# Patient Record
Sex: MALE | Race: WHITE | NOT HISPANIC OR LATINO | URBAN - METROPOLITAN AREA
[De-identification: names, ages, dates, MRNs, and addresses within clinical notes are randomized per-mention and may not be internally consistent; named-entity substitution may affect disease eponyms.]

---

## 2017-09-04 ENCOUNTER — EMERGENCY (EMERGENCY)
Facility: HOSPITAL | Age: 32
LOS: 1 days | Discharge: HOME | End: 2017-09-04
Attending: EMERGENCY MEDICINE | Admitting: EMERGENCY MEDICINE
Payer: MEDICAID

## 2017-09-04 VITALS
HEART RATE: 65 BPM | WEIGHT: 242.07 LBS | OXYGEN SATURATION: 100 % | RESPIRATION RATE: 16 BRPM | DIASTOLIC BLOOD PRESSURE: 79 MMHG | SYSTOLIC BLOOD PRESSURE: 128 MMHG | TEMPERATURE: 98 F | HEIGHT: 71 IN

## 2017-09-04 LAB — LITHIUM SERPL-MCNC: 0.9 MMOL/L — SIGNIFICANT CHANGE UP (ref 0.6–1.2)

## 2017-09-04 PROCEDURE — 99283 EMERGENCY DEPT VISIT LOW MDM: CPT

## 2017-09-04 PROCEDURE — 80178 ASSAY OF LITHIUM: CPT

## 2017-09-04 PROCEDURE — 36415 COLL VENOUS BLD VENIPUNCTURE: CPT

## 2017-09-04 NOTE — ED PROVIDER NOTE - OBJECTIVE STATEMENT
Pt  is a 30 yo male hx of bipolar, mrcp. pt lives at group home. pt has recently had lithium level changed from 2x daily to 3 times daily. pt had dose then adjusted again. pt went saturday home to family from group home and had 16 pills. pt today came back with only 6 pills and had already taken am dose on saturday. pt with slight agitation this am but now acting normally.  pt sent for lithium level.

## 2017-09-04 NOTE — ED ADULT NURSE NOTE - PMH
Bipolar 1 disorder    HTN (hypertension)    Mental retardation    Multiple personality disorder    Substance abuse  cocaine and marijuana

## 2017-09-04 NOTE — ED PROVIDER NOTE - PSYCHIATRIC, MLM
Alert and oriented to person, place, time/situation. memory mildly impaired, childlike affect, cooperativet. no apparent risk to self or others.

## 2017-09-08 DIAGNOSIS — Z03.6 ENCOUNTER FOR OBSERVATION FOR SUSPECTED TOXIC EFFECT FROM INGESTED SUBSTANCE RULED OUT: ICD-10-CM

## 2017-09-08 DIAGNOSIS — Z71.1 PERSON WITH FEARED HEALTH COMPLAINT IN WHOM NO DIAGNOSIS IS MADE: ICD-10-CM

## 2017-09-08 DIAGNOSIS — F79 UNSPECIFIED INTELLECTUAL DISABILITIES: ICD-10-CM

## 2017-09-08 DIAGNOSIS — F31.9 BIPOLAR DISORDER, UNSPECIFIED: ICD-10-CM

## 2017-09-08 DIAGNOSIS — I10 ESSENTIAL (PRIMARY) HYPERTENSION: ICD-10-CM

## 2019-03-15 ENCOUNTER — EMERGENCY (EMERGENCY)
Facility: HOSPITAL | Age: 34
LOS: 0 days | Discharge: HOME | End: 2019-03-15
Attending: EMERGENCY MEDICINE | Admitting: EMERGENCY MEDICINE
Payer: MEDICAID

## 2019-03-15 VITALS
WEIGHT: 190.04 LBS | DIASTOLIC BLOOD PRESSURE: 88 MMHG | SYSTOLIC BLOOD PRESSURE: 157 MMHG | HEART RATE: 76 BPM | RESPIRATION RATE: 18 BRPM | HEIGHT: 71 IN | OXYGEN SATURATION: 98 % | TEMPERATURE: 99 F

## 2019-03-15 VITALS
TEMPERATURE: 99 F | SYSTOLIC BLOOD PRESSURE: 152 MMHG | RESPIRATION RATE: 18 BRPM | DIASTOLIC BLOOD PRESSURE: 86 MMHG | OXYGEN SATURATION: 99 % | HEART RATE: 78 BPM

## 2019-03-15 DIAGNOSIS — S50.811A ABRASION OF RIGHT FOREARM, INITIAL ENCOUNTER: ICD-10-CM

## 2019-03-15 DIAGNOSIS — S60.221A CONTUSION OF RIGHT HAND, INITIAL ENCOUNTER: ICD-10-CM
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
## 2019-03-15 PROBLEM — F79 UNSPECIFIED INTELLECTUAL DISABILITIES: Chronic | Status: ACTIVE | Noted: 2017-09-04

## 2019-03-15 PROBLEM — I10 ESSENTIAL (PRIMARY) HYPERTENSION: Chronic | Status: ACTIVE | Noted: 2017-09-04

## 2019-03-15 PROBLEM — F44.81 DISSOCIATIVE IDENTITY DISORDER: Chronic | Status: ACTIVE | Noted: 2017-09-04

## 2019-03-15 PROBLEM — F31.9 BIPOLAR DISORDER, UNSPECIFIED: Chronic | Status: ACTIVE | Noted: 2017-09-04

## 2019-03-15 PROBLEM — F19.10 OTHER PSYCHOACTIVE SUBSTANCE ABUSE, UNCOMPLICATED: Chronic | Status: ACTIVE | Noted: 2017-09-04

## 2019-03-15 PROCEDURE — 99283 EMERGENCY DEPT VISIT LOW MDM: CPT | Mod: 25

## 2019-03-15 PROCEDURE — 73130 X-RAY EXAM OF HAND: CPT | Mod: 26,RT
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
## 2019-03-15 NOTE — ED ADULT NURSE NOTE - NSIMPLEMENTINTERV_GEN_ALL_ED
Implemented All Universal Safety Interventions:  [place] to call system. Call bell, personal items and telephone within reach. Instruct patient to call for assistance. Room bathroom lighting operational. Non-slip footwear when patient is off stretcher. Physically safe environment: no spills, clutter or unnecessary equipment. Stretcher in lowest position, wheels locked, appropriate side rails in place.

## 2019-05-31 PROBLEM — Z00.00 ENCOUNTER FOR PREVENTIVE HEALTH EXAMINATION: Status: ACTIVE | Noted: 2019-05-31

## 2019-06-03 ENCOUNTER — APPOINTMENT (OUTPATIENT)
Dept: DERMATOLOGY | Facility: CLINIC | Age: 34
End: 2019-06-03
Payer: MEDICAID

## 2019-06-03 VITALS — HEIGHT: 71 IN

## 2019-06-03 PROCEDURE — 99203 OFFICE O/P NEW LOW 30 MIN: CPT

## 2019-09-12 ENCOUNTER — TRANSCRIPTION ENCOUNTER (OUTPATIENT)
Age: 34
End: 2019-09-12

## 2019-12-16 ENCOUNTER — APPOINTMENT (OUTPATIENT)
Dept: DERMATOLOGY | Facility: CLINIC | Age: 34
End: 2019-12-16
Payer: MEDICAID

## 2019-12-16 PROCEDURE — 99213 OFFICE O/P EST LOW 20 MIN: CPT

## 2019-12-16 RX ORDER — PETROLATUM,WHITE
JELLY (GRAM) MISCELLANEOUS
Qty: 1 | Refills: 5 | Status: ACTIVE | COMMUNITY
Start: 2019-06-03 | End: 1900-01-01

## 2019-12-17 NOTE — ED PROVIDER NOTE - NS ED MD DISPO DISCHARGE
Was the patient seen in the last year in this department? Yes    Does patient have an active prescription for medications requested? Yes    Received Request Via: Patient     Patient states they are leaving out of town tomorrow and would like a refill.      Home

## 2020-01-12 ENCOUNTER — EMERGENCY (EMERGENCY)
Facility: HOSPITAL | Age: 35
LOS: 1 days | Discharge: HOME | End: 2020-01-12
Attending: EMERGENCY MEDICINE
Payer: MEDICAID

## 2020-01-12 VITALS
TEMPERATURE: 98 F | RESPIRATION RATE: 17 BRPM | OXYGEN SATURATION: 96 % | HEART RATE: 84 BPM | DIASTOLIC BLOOD PRESSURE: 74 MMHG | SYSTOLIC BLOOD PRESSURE: 144 MMHG

## 2020-01-12 VITALS
OXYGEN SATURATION: 96 % | RESPIRATION RATE: 16 BRPM | HEART RATE: 101 BPM | DIASTOLIC BLOOD PRESSURE: 94 MMHG | SYSTOLIC BLOOD PRESSURE: 147 MMHG | WEIGHT: 199.96 LBS | TEMPERATURE: 98 F

## 2020-01-12 LAB
ALBUMIN SERPL ELPH-MCNC: 4.3 G/DL — SIGNIFICANT CHANGE UP (ref 3.3–5)
ALP SERPL-CCNC: 48 U/L — SIGNIFICANT CHANGE UP (ref 40–120)
ALT FLD-CCNC: 19 U/L — SIGNIFICANT CHANGE UP (ref 12–78)
ANION GAP SERPL CALC-SCNC: 6 MMOL/L — SIGNIFICANT CHANGE UP (ref 5–17)
AST SERPL-CCNC: 11 U/L — LOW (ref 15–37)
BASOPHILS # BLD AUTO: 0.05 K/UL — SIGNIFICANT CHANGE UP (ref 0–0.2)
BASOPHILS NFR BLD AUTO: 0.6 % — SIGNIFICANT CHANGE UP (ref 0–2)
BILIRUB SERPL-MCNC: 0.6 MG/DL — SIGNIFICANT CHANGE UP (ref 0.2–1.2)
BUN SERPL-MCNC: 15 MG/DL — SIGNIFICANT CHANGE UP (ref 7–23)
CALCIUM SERPL-MCNC: 8.9 MG/DL — SIGNIFICANT CHANGE UP (ref 8.5–10.1)
CHLORIDE SERPL-SCNC: 112 MMOL/L — HIGH (ref 96–108)
CO2 SERPL-SCNC: 26 MMOL/L — SIGNIFICANT CHANGE UP (ref 22–31)
CREAT SERPL-MCNC: 0.85 MG/DL — SIGNIFICANT CHANGE UP (ref 0.5–1.3)
EOSINOPHIL # BLD AUTO: 0.2 K/UL — SIGNIFICANT CHANGE UP (ref 0–0.5)
EOSINOPHIL NFR BLD AUTO: 2.5 % — SIGNIFICANT CHANGE UP (ref 0–6)
GLUCOSE SERPL-MCNC: 106 MG/DL — HIGH (ref 70–99)
HCT VFR BLD CALC: 38.7 % — LOW (ref 39–50)
HGB BLD-MCNC: 13.9 G/DL — SIGNIFICANT CHANGE UP (ref 13–17)
HIV 1 & 2 AB SERPL IA.RAPID: SIGNIFICANT CHANGE UP
IMM GRANULOCYTES NFR BLD AUTO: 0.2 % — SIGNIFICANT CHANGE UP (ref 0–1.5)
LYMPHOCYTES # BLD AUTO: 1.92 K/UL — SIGNIFICANT CHANGE UP (ref 1–3.3)
LYMPHOCYTES # BLD AUTO: 23.9 % — SIGNIFICANT CHANGE UP (ref 13–44)
MCHC RBC-ENTMCNC: 31.6 PG — SIGNIFICANT CHANGE UP (ref 27–34)
MCHC RBC-ENTMCNC: 35.9 GM/DL — SIGNIFICANT CHANGE UP (ref 32–36)
MCV RBC AUTO: 88 FL — SIGNIFICANT CHANGE UP (ref 80–100)
MONOCYTES # BLD AUTO: 0.42 K/UL — SIGNIFICANT CHANGE UP (ref 0–0.9)
MONOCYTES NFR BLD AUTO: 5.2 % — SIGNIFICANT CHANGE UP (ref 2–14)
NEUTROPHILS # BLD AUTO: 5.41 K/UL — SIGNIFICANT CHANGE UP (ref 1.8–7.4)
NEUTROPHILS NFR BLD AUTO: 67.6 % — SIGNIFICANT CHANGE UP (ref 43–77)
NRBC # BLD: 0 /100 WBCS — SIGNIFICANT CHANGE UP (ref 0–0)
PLATELET # BLD AUTO: 217 K/UL — SIGNIFICANT CHANGE UP (ref 150–400)
POTASSIUM SERPL-MCNC: 4 MMOL/L — SIGNIFICANT CHANGE UP (ref 3.5–5.3)
POTASSIUM SERPL-SCNC: 4 MMOL/L — SIGNIFICANT CHANGE UP (ref 3.5–5.3)
PROT SERPL-MCNC: 6.9 G/DL — SIGNIFICANT CHANGE UP (ref 6–8.3)
RBC # BLD: 4.4 M/UL — SIGNIFICANT CHANGE UP (ref 4.2–5.8)
RBC # FLD: 11.9 % — SIGNIFICANT CHANGE UP (ref 10.3–14.5)
SODIUM SERPL-SCNC: 144 MMOL/L — SIGNIFICANT CHANGE UP (ref 135–145)
WBC # BLD: 8.02 K/UL — SIGNIFICANT CHANGE UP (ref 3.8–10.5)
WBC # FLD AUTO: 8.02 K/UL — SIGNIFICANT CHANGE UP (ref 3.8–10.5)

## 2020-01-12 PROCEDURE — 87491 CHLMYD TRACH DNA AMP PROBE: CPT

## 2020-01-12 PROCEDURE — 87591 N.GONORRHOEAE DNA AMP PROB: CPT

## 2020-01-12 PROCEDURE — 36415 COLL VENOUS BLD VENIPUNCTURE: CPT

## 2020-01-12 PROCEDURE — 85027 COMPLETE CBC AUTOMATED: CPT

## 2020-01-12 PROCEDURE — 99283 EMERGENCY DEPT VISIT LOW MDM: CPT

## 2020-01-12 PROCEDURE — 80053 COMPREHEN METABOLIC PANEL: CPT

## 2020-01-12 PROCEDURE — 86703 HIV-1/HIV-2 1 RESULT ANTBDY: CPT

## 2020-01-12 PROCEDURE — 80074 ACUTE HEPATITIS PANEL: CPT

## 2020-01-12 PROCEDURE — 86780 TREPONEMA PALLIDUM: CPT
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
## 2020-01-12 NOTE — ED ADULT NURSE NOTE - CHPI ED NUR SYMPTOMS NEG
no discharge/no dysuria/no fussiness/no insomnia/no loss of appetite/no acting out behaviors/no bruising/no burning urination/no social isolation/withdrawal/no abdominal pain/no redness/no blood in stool/not acting differently/no anxiety/no crying/no sleeping issues/no petechia/no scrotal swelling/no hearing problems/no jaw pain/no headache/no hematuria/no rectal pain/no abrasion/no vaginal bleeding/no vaginal discharge/no laceration
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
## 2020-01-12 NOTE — ED PROVIDER NOTE - CARE PROVIDER_API CALL
Ana Paula Vance (DO)  [place] [address]  Phone: [phone]  Fax: [phone]  Follow Up Time: 1-3 Days

## 2020-01-12 NOTE — ED PROVIDER NOTE - NONTENDER LOCATION
left upper quadrant/right upper quadrant/right lower quadrant/periumbilical/left lower quadrant/umbilical/suprapubic
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
## 2020-01-12 NOTE — ED ADULT NURSE NOTE - PMH
Bipolar 1 disorder    HTN (hypertension)    Mental retardation    Multiple personality disorder    No pertinent past medical history    Substance abuse  cocaine and marijuana

## 2020-01-12 NOTE — ED ADULT NURSE NOTE - NS PRO PASSIVE SMOKE EXP
Problem: Patient Care Overview  Goal: Plan of Care Review  Outcome: Ongoing (interventions implemented as appropriate)   09/12/19 0137   Coping/Psychosocial   Plan of Care Reviewed With patient   OTHER   Outcome Summary VSS, ambulating in [place], voiding well, minimal pain, zen dressing, drain, anticipating home in am     Goal: Individualization and Mutuality  Outcome: Ongoing (interventions implemented as appropriate)    Goal: Discharge Needs Assessment  Outcome: Ongoing (interventions implemented as appropriate)    Goal: Interprofessional Rounds/Family Conf  Outcome: Ongoing (interventions implemented as appropriate)      Problem: Pain, Chronic (Adult)  Goal: Acceptable Pain/Comfort Level and Functional Ability  Outcome: Ongoing (interventions implemented as appropriate)      Problem: Fall Risk (Adult)  Goal: Absence of Fall  Outcome: Ongoing (interventions implemented as appropriate)      Problem: Knee Arthroplasty (Total, Partial) (Adult)  Goal: Signs and Symptoms of Listed Potential Problems Will be Absent, Minimized or Managed (Knee Arthroplasty)  Outcome: Ongoing (interventions implemented as appropriate)         No

## 2020-01-12 NOTE — ED PROVIDER NOTE - PATIENT PORTAL LINK FT
You can access the FollowMyHealth Patient Portal offered by [facility] by registering at the following website: [email or web address]. By joining [employer]’s FollowMyHealth portal, you will also be able to view your health information using other applications (apps) compatible with our system.

## 2020-01-13 LAB
C TRACH RRNA SPEC QL NAA+PROBE: SIGNIFICANT CHANGE UP
HAV IGM SER-ACNC: SIGNIFICANT CHANGE UP
HBV CORE IGM SER-ACNC: SIGNIFICANT CHANGE UP
HBV SURFACE AG SER-ACNC: SIGNIFICANT CHANGE UP
HCV AB S/CO SERPL IA: 0.13 S/CO — SIGNIFICANT CHANGE UP (ref 0–0.99)
HCV AB SERPL-IMP: SIGNIFICANT CHANGE UP
N GONORRHOEA RRNA SPEC QL NAA+PROBE: SIGNIFICANT CHANGE UP
SPECIMEN SOURCE: SIGNIFICANT CHANGE UP
T PALLIDUM AB TITR SER: NEGATIVE — SIGNIFICANT CHANGE UP

## 2020-02-18 ENCOUNTER — APPOINTMENT (OUTPATIENT)
Dept: PEDIATRIC ALLERGY IMMUNOLOGY | Facility: CLINIC | Age: 35
End: 2020-02-18
Payer: MEDICAID

## 2020-02-18 VITALS
BODY MASS INDEX: 33.36 KG/M2 | HEART RATE: 69 BPM | DIASTOLIC BLOOD PRESSURE: 82 MMHG | OXYGEN SATURATION: 98 % | SYSTOLIC BLOOD PRESSURE: 125 MMHG | WEIGHT: 238.3 LBS | HEIGHT: 70.98 IN

## 2020-02-18 DIAGNOSIS — L85.3 XEROSIS CUTIS: ICD-10-CM

## 2020-02-18 DIAGNOSIS — Z78.9 OTHER SPECIFIED HEALTH STATUS: ICD-10-CM

## 2020-02-18 PROCEDURE — 95004 PERQ TESTS W/ALRGNC XTRCS: CPT

## 2020-02-18 PROCEDURE — 99244 OFF/OP CNSLTJ NEW/EST MOD 40: CPT | Mod: 25

## 2020-02-18 RX ORDER — LITHIUM CARBONATE 300 MG/1
300 TABLET ORAL TWICE DAILY
Refills: 0 | Status: ACTIVE | COMMUNITY

## 2020-02-18 RX ORDER — DIVALPROEX SODIUM 500 MG/1
500 TABLET, DELAYED RELEASE ORAL TWICE DAILY
Refills: 0 | Status: ACTIVE | COMMUNITY

## 2020-02-18 NOTE — IMPRESSION
[Allergy Testing Dust Mite] : dust mites [Allergy Testing Mixed Feathers] : feathers [Allergy Testing Cockroach] : cockroach [Allergy Testing Dog] : dog [] : molds [Allergy Testing Cat] : cat [Allergy Testing Trees] : trees [Allergy Testing Weeds] : weeds [Allergy Testing Grasses] : grasses

## 2020-02-18 NOTE — CONSULT LETTER
[Dear  ___] : Dear ~CAYLA, [Consult Letter:] : I had the pleasure of evaluating your patient, [unfilled]. [Consult Closing:] : Thank you very much for allowing me to participate in the care of this patient.  If you have any questions, please do not hesitate to contact me. [Please see my note below.] : Please see my note below. [Sincerely,] : Sincerely, [FreeTextEntry2] : FANY CORONADO [FreeTextEntry3] : Bere Fitzgerald MD\par Attending Physician, Division of Allergy and Immunology\par , Departments of Medicine and Pediatrics\par [employer] [facility]\par Kingsley Fonseca [facility] \par [facility]

## 2020-02-18 NOTE — HISTORY OF PRESENT ILLNESS
[Asthma] : asthma [Venom Reactions] : venom reactions [Food Allergies] : food allergies [de-identified] : 34 year old male presents with chronic rhinitis and postnasal drip:\par \par The patient reports symptoms of nasal congestion, rhinorrhea, sneezing. Symptoms are reportedly worse with pollen exposure and exposure to dust. The patient has tried Fluticasone and Cetirizine with some symptom relief. There are cats at his mother's house, which he reports don’t bother him. He lives in a group home. No carpets. No second hand smoke exposure.\par \par Following with derm for dry skin, using Dove, Aveeno moisturizer and Vaseline.

## 2020-02-18 NOTE — PHYSICAL EXAM
[Alert] : alert [No Acute Distress] : no acute distress [Healthy Appearance] : healthy appearance [Well Nourished] : well nourished [Well Developed] : well developed [Normal Pupil & Iris Size/Symmetry] : normal pupil and iris size and symmetry [No Discharge] : no discharge [No Photophobia] : no photophobia [Sclera Not Icteric] : sclera not icteric [Normal TMs] : both tympanic membranes were normal [Normal Outer Ear/Nose] : the ears and nose were normal in appearance [Normal Lips/Tongue] : the lips and tongue were normal [Normal Nasal Mucosa] : the nasal mucosa was normal [Normal Tonsils] : normal tonsils [Normal Dentition] : normal dentition [No Thrush] : no thrush [No Oral Lesions or Ulcers] : no oral lesions or ulcers [Boggy Nasal Turbinates] : boggy and/or pale nasal turbinates [Posterior Pharyngeal Cobblestoning] : posterior pharyngeal cobblestoning [No LAD] : no lymphadenopathy [No Neck Mass] : no neck mass was observed [Supple] : the neck was supple [Normal Rate and Effort] : normal respiratory rhythm and effort [No Crackles] : no crackles [No Retractions] : no retractions [Bilateral Audible Breath Sounds] : bilateral audible breath sounds [Normal Rate] : heart rate was normal  [Normal S1, S2] : normal S1 and S2 [Regular Rhythm] : with a regular rhythm [No murmur] : no murmur [Not Distended] : not distended [Not Tender] : non-tender [Soft] : abdomen soft [No HSM] : no hepato-splenomegaly [Normal Cervical Lymph Nodes] : cervical [No Rash] : no rash [Skin Intact] : skin intact  [No Skin Lesions] : no skin lesions [No clubbing] : no clubbing [No Cyanosis] : no cyanosis [Alert, Awake, Oriented as Age-Appropriate] : alert, awake, oriented as age appropriate [Normal Affect] : affect was normal [Normal Mood] : mood was normal [Pharyngeal erythema] : no pharyngeal erythema [Conjunctival Erythema] : no conjunctival erythema [Exudate] : no exudate [Clear Rhinorrhea] : no clear rhinorrhea was seen [Wheezing] : no wheezing was heard [Eczematous Patches] : no eczematous patches [Xerosis] : no xerosis

## 2020-02-18 NOTE — REVIEW OF SYSTEMS
[Rhinorrhea] : rhinorrhea [Sneezing] : sneezing [Nasal Congestion] : nasal congestion [Nl] : Genitourinary [Immunizations are up to date] : Immunizations are up to date [Received Influenza Vaccine this Past Year] : patient has received the Influenza vaccine this past year [Dry Skin] : ~L dry skin

## 2020-02-18 NOTE — SOCIAL HISTORY
[Cat] : cat [FreeTextEntry1] : lives in a group home [Bedroom] : not in the bedroom [Living Area] : not in the living area [Smokers in Household] : there are no smokers in the home

## 2020-06-15 ENCOUNTER — RX RENEWAL (OUTPATIENT)
Age: 35
End: 2020-06-15

## 2020-06-16 ENCOUNTER — APPOINTMENT (OUTPATIENT)
Dept: DERMATOLOGY | Facility: CLINIC | Age: 35
End: 2020-06-16

## 2020-07-08 ENCOUNTER — RX RENEWAL (OUTPATIENT)
Age: 35
End: 2020-07-08

## 2020-10-31 ENCOUNTER — TRANSCRIPTION ENCOUNTER (OUTPATIENT)
Age: 35
End: 2020-10-31

## 2020-11-13 ENCOUNTER — RX RENEWAL (OUTPATIENT)
Age: 35
End: 2020-11-13

## 2020-11-18 ENCOUNTER — EMERGENCY (EMERGENCY)
Facility: HOSPITAL | Age: 35
LOS: 1 days | Discharge: HOME | End: 2020-11-18
Attending: EMERGENCY MEDICINE | Admitting: EMERGENCY MEDICINE
Payer: MEDICAID

## 2020-11-18 VITALS
HEART RATE: 74 BPM | TEMPERATURE: 99 F | RESPIRATION RATE: 14 BRPM | SYSTOLIC BLOOD PRESSURE: 146 MMHG | HEIGHT: 78 IN | OXYGEN SATURATION: 100 % | DIASTOLIC BLOOD PRESSURE: 90 MMHG

## 2020-11-18 PROCEDURE — 99283 EMERGENCY DEPT VISIT LOW MDM: CPT | Mod: 25

## 2020-11-18 PROCEDURE — 73130 X-RAY EXAM OF HAND: CPT

## 2020-11-18 PROCEDURE — 73130 X-RAY EXAM OF HAND: CPT | Mod: 26,RT

## 2020-11-18 PROCEDURE — 99283 EMERGENCY DEPT VISIT LOW MDM: CPT

## 2020-11-18 RX ORDER — ACETAMINOPHEN 500 MG
650 TABLET ORAL ONCE
Refills: 0 | Status: COMPLETED | OUTPATIENT
Start: 2020-11-18 | End: 2020-11-18

## 2020-11-18 RX ADMIN — Medication 650 MILLIGRAM(S): at 17:02
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
## 2020-11-18 NOTE — ED PROVIDER NOTE - PATIENT PORTAL LINK FT
You can access the FollowMyHealth Patient Portal offered by [facility] by registering at the following website: [email or web address]. By joining [employer]’s FollowMyHealth portal, you will also be able to view your health information using other applications (apps) compatible with our system.

## 2020-11-18 NOTE — ED ADULT NURSE NOTE - OBJECTIVE STATEMENT
Noted only superficial scratching to R lateral hand.  no noted drainage.  No noted areas able to be sutured.  Will await provider

## 2020-11-18 NOTE — ED ADULT NURSE NOTE - NSIMPLEMENTINTERV_GEN_ALL_ED
Implemented All Universal Safety Interventions:  [place] to call system. Call bell, personal items and telephone within reach. Instruct patient to call for assistance. Room bathroom lighting operational. Non-slip footwear when patient is off stretcher. Physically safe environment: no spills, clutter or unnecessary equipment. Stretcher in lowest position, wheels locked, appropriate side rails in place.

## 2020-11-18 NOTE — ED PROVIDER NOTE - MUSCULOSKELETAL, MLM
Spine appears normal, range of motion is not limited; R hand: +abrasions noted to R 5th finger and along dorsal aspect of R 5th MCPJ with mild ttp to proximal R 5th MCPJ, +minimal swelling noted, FROM R hand/fingers/wrist, able to make a fist, no snuffbox tenderness noted, cap refill<2sec, pulses and sensation intact, NVI

## 2020-11-18 NOTE — ED PROVIDER NOTE - SKIN, MLM
+minor abrasions noted to R 5th finger and lateral aspect of R dorsal hand, small abrasion noted to L upper cheek, no FB or active bleeding noted, no lacerations noted

## 2020-11-18 NOTE — ED PROVIDER NOTE - PROGRESS NOTE DETAILS
Hand xrays without acute fracture or dislocation, will clean abrasions, bacitracin to abrasions, f/u pcp. Hand xrays without acute fracture or dislocation, will clean abrasions, bacitracin to abrasions, f/u pcp.  As per supervisor Jaja at group home, pt does not need covid swab for discharge/return to facility.

## 2020-11-18 NOTE — ED PROVIDER NOTE - CARE PLAN
Principal Discharge DX:	Hand contusion  Goal:	right  Secondary Diagnosis:	Abrasions of multiple sites

## 2020-11-18 NOTE — ED PROVIDER NOTE - NSFOLLOWUPINSTRUCTIONS_ED_ALL_ED_FT
Follow up with your PMD in 1-2 days for re-evaluation, ongoing care and treatment. Keep abrasions clean and dry. Apply bacitracin twice daily. Ice compresses to hand for 20 minutes every 4 hours the first 24 hours, motrin 600mg every 8 hours with food or tylenol 650mg every 6 hours as needed for pain. If having worsening of symptoms or other related symptoms, RETURN TO THE ER IMMEDIATELY.     Abrasion       An abrasion is a cut or a scrape on the surface of your skin. An abrasion does not go through all the layers of your skin. It is important to take good care of your abrasion to prevent infection.      Follow these instructions at home:    Medicines     •Take or apply over-the-counter and prescription medicines only as told by your doctor.      •If you were prescribed an antibiotic medicine, apply it as told by your doctor. Do not stop using the antibiotic even if you start to feel better.      Wound care   •Clean the wound 2–3 times a day or as often as told by your doctor. To do this:  1.Wash the wound with mild soap and water.      2.Rinse off the soap.      3.Pat a clean towel on the wound to dry it. Do not rub it.        •Keep the bandage (dressing) clean and dry as told by your doctor.    •Follow instructions from your doctor about how to take care of your wound. Make sure you:   •Wash your hands with soap and water before you change your bandage. If you cannot use soap and water, use hand .      •Change your bandage as told by your doctor.       •Check your wound every day for signs of infection. Check for:  •Redness, swelling, or pain.       •Fluid or blood.       •Warmth.       •Pus or a bad smell.      •If directed, put ice on the injured area. To do this:  •Put ice in a plastic bag.       •Place a towel between your skin and the bag.       •Leave the ice on for 20 minutes, 2–3 times a day.        General instructions     • Do not take baths, swim, or use a hot tub until your doctor says it is okay.      •If there is swelling, raise (elevate) the injured area above the level of your heart while you are sitting or lying down.      •Keep all follow-up visits as told by your doctor. This is important.        Contact a doctor if:  •You were given a tetanus shot, and you have any of these where the needle went in:  •Swelling.      •Very bad pain.      •Redness.      •Bleeding.        •You have a lot of pain, and medicine does not help.    •You have any of these at the site of the wound:  •More redness.      •More swelling.      •More pain.          Get help right away if:    •You have a red streak going away from your wound.      •You have a fever.      •You have fluid, blood, or pus coming from your wound.      •There is a bad smell coming from your wound or bandage.        Summary    •An abrasion is a cut or a scrape on the surface of your skin.      •Take good care of your abrasion so it does not get infected.      •Clean the wound with mild soap and water, and change your bandage as told by your doctor.      •Call your doctor if you have redness, swelling, or more pain in your wound.      •Get help right away if you have a fever or if you have fluid, blood, pus, a bad smell, or a red streak coming from the wound.      This information is not intended to replace advice given to you by your health care provider. Make sure you discuss any questions you have with your health care provider.
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
## 2021-01-06 ENCOUNTER — EMERGENCY (EMERGENCY)
Facility: HOSPITAL | Age: 36
LOS: 1 days | End: 2021-01-06
Attending: EMERGENCY MEDICINE
Payer: MEDICAID

## 2021-01-06 VITALS
RESPIRATION RATE: 18 BRPM | WEIGHT: 199.96 LBS | TEMPERATURE: 97 F | SYSTOLIC BLOOD PRESSURE: 156 MMHG | DIASTOLIC BLOOD PRESSURE: 93 MMHG | HEART RATE: 69 BPM | HEIGHT: 78 IN | OXYGEN SATURATION: 97 %

## 2021-01-06 PROCEDURE — 73130 X-RAY EXAM OF HAND: CPT | Mod: 26,LT

## 2021-01-06 PROCEDURE — 29125 APPL SHORT ARM SPLINT STATIC: CPT | Mod: LT

## 2021-01-06 PROCEDURE — 99283 EMERGENCY DEPT VISIT LOW MDM: CPT | Mod: 25

## 2021-01-06 PROCEDURE — 73130 X-RAY EXAM OF HAND: CPT

## 2021-01-06 PROCEDURE — 99284 EMERGENCY DEPT VISIT MOD MDM: CPT | Mod: 25

## 2021-01-06 RX ORDER — IBUPROFEN 200 MG
600 TABLET ORAL ONCE
Refills: 0 | Status: COMPLETED | OUTPATIENT
Start: 2021-01-06 | End: 2021-01-06

## 2021-01-06 RX ADMIN — Medication 600 MILLIGRAM(S): at 17:18

## 2021-01-06 NOTE — ED ADULT NURSE NOTE - OBJECTIVE STATEMENT
Pt presents to the ED s/p left hand swelling, healing scars after punched a wall yesterday, + mobility of the fingers, skin warm and dry, + sensation, + capillary refill < 2sec

## 2021-01-06 NOTE — ED PROVIDER NOTE - PATIENT PORTAL LINK FT
You can access the FollowMyHealth Patient Portal offered by [facility] by registering at the following website: [email or web address]. By joining [employer]’s FollowMyHealth portal, you will also be able to view your health information using other applications (apps) compatible with our system.

## 2021-01-06 NOTE — ED PROVIDER NOTE - MUSCULOSKELETAL, MLM
+tenderness, swelling, and ecchymosis to left hand overlying 4th and 5th metacarpals. full ROM., no tenderness to wrist, elbow, or shoulder

## 2021-01-06 NOTE — ED PROVIDER NOTE - OBJECTIVE STATEMENT
35 year old male with history of MR, bipolar, and borderline HTN presents with pain, swelling, and ecchymosis to left hand after injury. punched a wall yesterday at group [place]. +pain, swelling, and ecchymosis. pain 8/10. no n/t. left handed. denies other injuries. has not taken anything for pain or symptoms   resident at [facility]  PCP Sherry Maldonado

## 2021-01-06 NOTE — ED PROVIDER NOTE - CLINICAL SUMMARY MEDICAL DECISION MAKING FREE TEXT BOX
left hand pain and swelling after punching wall. suspect fx. will x-ray. no evidence of open fx. full ROM. hand follow up. denies other pain or complaints. no evidence of n/v injury

## 2021-01-06 NOTE — ED PROVIDER NOTE - NSFOLLOWUPINSTRUCTIONS_ED_ALL_ED_FT
splint, ice, elevate  tylenol or motrin for pain  follow up with hand specialist this week. call tomorrow for appointment. referral provided     Boxer Fracture    WHAT YOU NEED TO KNOW:    A boxer fracture is a break of a bone in your hand. It usually happens in the bone that connects your wrist to your little finger or ring finger.     DISCHARGE INSTRUCTIONS:    Return to the emergency department if:   •You cannot bend or extend your finger.      •You have severe pain.      •You have numbness or tingling in your finger.      Call your doctor if:   •You have a fever.      •Your open wound is red, swollen, or draining pus.      •You have trouble moving your finger.      •You have questions or concerns about your condition or care.      Medicines: You may need any of the following:   •NSAIDs, such as ibuprofen, help decrease swelling, pain, and fever. This medicine is available with or without a doctor's order. NSAIDs can cause stomach bleeding or kidney problems in certain people. If you take blood thinner medicine, always ask your healthcare provider if NSAIDs are safe for you. Always read the medicine label and follow directions.      •Prescription pain medicine may be given. Ask your healthcare provider how to take this medicine safely. Some prescription pain medicines contain acetaminophen. Do not take other medicines that contain acetaminophen without talking to your healthcare provider. Too much acetaminophen may cause liver damage. Prescription pain medicine may cause constipation. Ask your healthcare provider how to prevent or treat constipation.       •Take your medicine as directed. Contact your healthcare provider if you think your medicine is not helping or if you have side effects. Tell him or her if you are allergic to any medicine. Keep a list of the medicines, vitamins, and herbs you take. Include the amounts, and when and why you take them. Bring the list or the pill bottles to follow-up visits. Carry your medicine list with you in case of an emergency.      Self-care:   •Apply ice on your injury for 15 to 20 minutes every hour for 24 hours or as directed. Use an ice pack, or put crushed ice in a plastic bag. Cover it with a towel. Ice helps prevent tissue damage and decreases swelling and pain.      •Elevate your hand above the level of your heart as often as you can. This will help decrease swelling and pain. Prop your hand on pillows or blankets to keep it elevated comfortably.       •Go to physical therapy if directed. A physical therapist teaches you exercises to help improve movement and strength, and to decrease pain.      Follow up with your doctor or orthopedist as directed: You may need to return for more x-rays or another cast. Write down your questions so you remember to ask them during your visits.

## 2021-01-06 NOTE — ED PROVIDER NOTE - PROGRESS NOTE DETAILS
spoke with Dr. Johnson. reviewed x-rays. recommends splint and will follow up in office. splint applied. pain improved. referral information provided.   An opportunity to ask questions was given.  Discussed the importance of prompt, close medical follow-up.  Patient will return with any changes, concerns or persistent / worsening symptoms.  Understanding of all instructions verbalized.

## 2021-01-06 NOTE — ED PROVIDER NOTE - CARE PROVIDER_API CALL
Anil Johnson (MD)  Plastic Surgery  [address] [place] [address], [address] [place] [id number]  Phone: [phone]  Fax: [phone]  Follow Up Time: 1-3 Days

## 2021-02-06 ENCOUNTER — EMERGENCY (EMERGENCY)
Facility: HOSPITAL | Age: 36
LOS: 1 days | Discharge: HOME | End: 2021-02-06
Attending: EMERGENCY MEDICINE | Admitting: EMERGENCY MEDICINE
Payer: MEDICAID

## 2021-02-06 VITALS
OXYGEN SATURATION: 97 % | SYSTOLIC BLOOD PRESSURE: 128 MMHG | RESPIRATION RATE: 16 BRPM | HEART RATE: 79 BPM | TEMPERATURE: 98 F | DIASTOLIC BLOOD PRESSURE: 92 MMHG

## 2021-02-06 VITALS
TEMPERATURE: 98 F | RESPIRATION RATE: 16 BRPM | HEIGHT: 78 IN | SYSTOLIC BLOOD PRESSURE: 136 MMHG | HEART RATE: 94 BPM | WEIGHT: 199.96 LBS | DIASTOLIC BLOOD PRESSURE: 97 MMHG | OXYGEN SATURATION: 96 %

## 2021-02-06 LAB
ALBUMIN SERPL ELPH-MCNC: 4.1 G/DL — SIGNIFICANT CHANGE UP (ref 3.3–5)
ALP SERPL-CCNC: 54 U/L — SIGNIFICANT CHANGE UP (ref 40–120)
ALT FLD-CCNC: 17 U/L — SIGNIFICANT CHANGE UP (ref 12–78)
ANION GAP SERPL CALC-SCNC: 6 MMOL/L — SIGNIFICANT CHANGE UP (ref 5–17)
APTT BLD: 35 SEC — SIGNIFICANT CHANGE UP (ref 27.5–35.5)
AST SERPL-CCNC: 10 U/L — LOW (ref 15–37)
BASOPHILS # BLD AUTO: 0.03 K/UL — SIGNIFICANT CHANGE UP (ref 0–0.2)
BASOPHILS NFR BLD AUTO: 0.6 % — SIGNIFICANT CHANGE UP (ref 0–2)
BILIRUB SERPL-MCNC: 0.6 MG/DL — SIGNIFICANT CHANGE UP (ref 0.2–1.2)
BUN SERPL-MCNC: 15 MG/DL — SIGNIFICANT CHANGE UP (ref 7–23)
CALCIUM SERPL-MCNC: 8.8 MG/DL — SIGNIFICANT CHANGE UP (ref 8.5–10.1)
CHLORIDE SERPL-SCNC: 111 MMOL/L — HIGH (ref 96–108)
CO2 SERPL-SCNC: 26 MMOL/L — SIGNIFICANT CHANGE UP (ref 22–31)
CREAT SERPL-MCNC: 0.84 MG/DL — SIGNIFICANT CHANGE UP (ref 0.5–1.3)
EOSINOPHIL # BLD AUTO: 0.05 K/UL — SIGNIFICANT CHANGE UP (ref 0–0.5)
EOSINOPHIL NFR BLD AUTO: 1 % — SIGNIFICANT CHANGE UP (ref 0–6)
GLUCOSE SERPL-MCNC: 96 MG/DL — SIGNIFICANT CHANGE UP (ref 70–99)
HCT VFR BLD CALC: 40.8 % — SIGNIFICANT CHANGE UP (ref 39–50)
HGB BLD-MCNC: 14.6 G/DL — SIGNIFICANT CHANGE UP (ref 13–17)
IMM GRANULOCYTES NFR BLD AUTO: 0.2 % — SIGNIFICANT CHANGE UP (ref 0–1.5)
INR BLD: 1.02 RATIO — SIGNIFICANT CHANGE UP (ref 0.88–1.16)
LYMPHOCYTES # BLD AUTO: 1.6 K/UL — SIGNIFICANT CHANGE UP (ref 1–3.3)
LYMPHOCYTES # BLD AUTO: 30.5 % — SIGNIFICANT CHANGE UP (ref 13–44)
MCHC RBC-ENTMCNC: 31.7 PG — SIGNIFICANT CHANGE UP (ref 27–34)
MCHC RBC-ENTMCNC: 35.8 GM/DL — SIGNIFICANT CHANGE UP (ref 32–36)
MCV RBC AUTO: 88.5 FL — SIGNIFICANT CHANGE UP (ref 80–100)
MONOCYTES # BLD AUTO: 0.33 K/UL — SIGNIFICANT CHANGE UP (ref 0–0.9)
MONOCYTES NFR BLD AUTO: 6.3 % — SIGNIFICANT CHANGE UP (ref 2–14)
NEUTROPHILS # BLD AUTO: 3.23 K/UL — SIGNIFICANT CHANGE UP (ref 1.8–7.4)
NEUTROPHILS NFR BLD AUTO: 61.4 % — SIGNIFICANT CHANGE UP (ref 43–77)
NRBC # BLD: 0 /100 WBCS — SIGNIFICANT CHANGE UP (ref 0–0)
PLATELET # BLD AUTO: 168 K/UL — SIGNIFICANT CHANGE UP (ref 150–400)
POTASSIUM SERPL-MCNC: 4.3 MMOL/L — SIGNIFICANT CHANGE UP (ref 3.5–5.3)
POTASSIUM SERPL-SCNC: 4.3 MMOL/L — SIGNIFICANT CHANGE UP (ref 3.5–5.3)
PROT SERPL-MCNC: 7.1 G/DL — SIGNIFICANT CHANGE UP (ref 6–8.3)
PROTHROM AB SERPL-ACNC: 11.9 SEC — SIGNIFICANT CHANGE UP (ref 10.6–13.6)
RBC # BLD: 4.61 M/UL — SIGNIFICANT CHANGE UP (ref 4.2–5.8)
RBC # FLD: 13 % — SIGNIFICANT CHANGE UP (ref 10.3–14.5)
SARS-COV-2 RNA SPEC QL NAA+PROBE: SIGNIFICANT CHANGE UP
SODIUM SERPL-SCNC: 143 MMOL/L — SIGNIFICANT CHANGE UP (ref 135–145)
TROPONIN I SERPL-MCNC: <.015 NG/ML — SIGNIFICANT CHANGE UP (ref 0.01–0.04)
VALPROATE SERPL-MCNC: 64 UG/ML — SIGNIFICANT CHANGE UP (ref 50–100)
WBC # BLD: 5.25 K/UL — SIGNIFICANT CHANGE UP (ref 3.8–10.5)
WBC # FLD AUTO: 5.25 K/UL — SIGNIFICANT CHANGE UP (ref 3.8–10.5)

## 2021-02-06 PROCEDURE — 71045 X-RAY EXAM CHEST 1 VIEW: CPT

## 2021-02-06 PROCEDURE — 70450 CT HEAD/BRAIN W/O DYE: CPT | Mod: 26,MA

## 2021-02-06 PROCEDURE — U0005: CPT

## 2021-02-06 PROCEDURE — 70486 CT MAXILLOFACIAL W/O DYE: CPT

## 2021-02-06 PROCEDURE — 70450 CT HEAD/BRAIN W/O DYE: CPT

## 2021-02-06 PROCEDURE — 93010 ELECTROCARDIOGRAM REPORT: CPT

## 2021-02-06 PROCEDURE — 36415 COLL VENOUS BLD VENIPUNCTURE: CPT

## 2021-02-06 PROCEDURE — 80164 ASSAY DIPROPYLACETIC ACD TOT: CPT

## 2021-02-06 PROCEDURE — 71045 X-RAY EXAM CHEST 1 VIEW: CPT | Mod: 26

## 2021-02-06 PROCEDURE — 85730 THROMBOPLASTIN TIME PARTIAL: CPT

## 2021-02-06 PROCEDURE — 85610 PROTHROMBIN TIME: CPT

## 2021-02-06 PROCEDURE — 99284 EMERGENCY DEPT VISIT MOD MDM: CPT | Mod: 25

## 2021-02-06 PROCEDURE — 70486 CT MAXILLOFACIAL W/O DYE: CPT | Mod: 26,MA

## 2021-02-06 PROCEDURE — 93005 ELECTROCARDIOGRAM TRACING: CPT

## 2021-02-06 PROCEDURE — 99285 EMERGENCY DEPT VISIT HI MDM: CPT

## 2021-02-06 PROCEDURE — 84484 ASSAY OF TROPONIN QUANT: CPT

## 2021-02-06 PROCEDURE — U0003: CPT

## 2021-02-06 PROCEDURE — 80053 COMPREHEN METABOLIC PANEL: CPT

## 2021-02-06 PROCEDURE — 85025 COMPLETE CBC W/AUTO DIFF WBC: CPT

## 2021-02-06 RX ORDER — ACETAMINOPHEN 500 MG
650 TABLET ORAL ONCE
Refills: 0 | Status: COMPLETED | OUTPATIENT
Start: 2021-02-06 | End: 2021-02-06

## 2021-02-06 RX ORDER — IBUPROFEN 200 MG
1 TABLET ORAL
Qty: 0 | Refills: 0 | DISCHARGE

## 2021-02-06 RX ORDER — QUETIAPINE FUMARATE 200 MG/1
0 TABLET, FILM COATED ORAL
Qty: 0 | Refills: 0 | DISCHARGE

## 2021-02-06 RX ORDER — LITHIUM CARBONATE 300 MG/1
0 TABLET, EXTENDED RELEASE ORAL
Qty: 0 | Refills: 0 | DISCHARGE

## 2021-02-06 RX ORDER — SODIUM CHLORIDE 9 MG/ML
1000 INJECTION INTRAMUSCULAR; INTRAVENOUS; SUBCUTANEOUS ONCE
Refills: 0 | Status: COMPLETED | OUTPATIENT
Start: 2021-02-06 | End: 2021-02-06

## 2021-02-06 RX ORDER — SERTRALINE 25 MG/1
1 TABLET, FILM COATED ORAL
Qty: 0 | Refills: 0 | DISCHARGE

## 2021-02-06 RX ADMIN — Medication 650 MILLIGRAM(S): at 17:27

## 2021-02-06 RX ADMIN — SODIUM CHLORIDE 1000 MILLILITER(S): 9 INJECTION INTRAMUSCULAR; INTRAVENOUS; SUBCUTANEOUS at 17:30

## 2021-02-06 NOTE — ED PROVIDER NOTE - ATTENDING CONTRIBUTION TO CARE
Pt is a 36 yo male who presents to the ED with a cc of syncopal episode. PMhx of Bipolar disorder, HTN, h/o MR, multiple personality disorder. Pt reports that today he had a syncopal episode while sitting on the toilet urinating. Pt reports that while he was sitting urinating he became lightheaded, and then had a syncopal episode falling forward off the toilet and striking his head on the ground. Pt is unsure of length of LOC. He was eventually able to get up and ambulated back to his room where he laid down. he then told a staff member. Pt reports that he has pain to his right eye at site of impact and also reports a mild HA. Denies fever, chills, visual changes, N/V, CP, SOB, abd pain, ext numbness or weakness. Pt denies prior history of syncope. Does report h/o left hand fracture for which he is currently in a splint. He reports that he did not injure this during this episode. On exam pt sitting up in bed NAD,  PERRL, EOMI, heart RR, lungs CTA, abd soft NT/ND. Pt moving all ext with no focal deficits. Able to recount history appears to only have mild developmental delay. Contusion to right orbital region with no nadia deformity TTP. healing abrasion to posterior occiput from prior shaving accident. Left hand currently in splint. Pt with syncopal episode while urinating, appears to be micturition syncope. Will obtain screening labs, EKG, CT head/facial bones, and check orthostatics. Will monitor

## 2021-02-06 NOTE — ED PROVIDER NOTE - CLINICAL SUMMARY MEDICAL DECISION MAKING FREE TEXT BOX
34 yo M with hx of bipolar d/o, MR here with staff from group home sp syncopal episode today, pt states that he felt dizzy while sitting on the toilet today and fell forward and passed out, hitting his R side of head on the tile floor, feels fine now except mild R head/forehead discomfort, no n/v/vision changes/weakness/cp/sob; VSS; +mild bruising with ttp noted to lateral R eye/temple, mild abrasion noted to R parietal scalp, FROM X 4, NVI, lungs cta B, no c/t/l spine tenderness, no blood thinners; likely vasovagal syncope; will get ct head and facial bones, labs, IVF, orthostatics, re-assess

## 2021-02-06 NOTE — ED ADULT NURSE NOTE - CHEST APPEARANCE
Need for home hospice evaluation was communicated by Dr. Flowers on 7/15. Writer (ANAMARIA Cooper also present for meeting) spoke with pts wife Melinda and explained the hospice philosophy of comfort care instead of curative treatment, hospice benefits, levels of hospice care provided, settings in which home hospice can be provided, and insurance coverage. Melinda in agreement with TF & hyperbaric treatments being discontinued. Melinda in agreement to home hospice.      24 hour care is able to be provided by Melinda. ANAMARIA Cooper provided list of paid caregiver agencies also for Melinda. Melinda requesting Friday 7/17 discharge for pt to allow time to prepare home for DME, etc.  Equipment ordered (hospital bed extra long, full side rails & MARY mattress, over bed table conf. # [id number]) and will be delivered on 7/16 between 4-8 pm. State DNR bracelet to be completed  and placed prior to discharge. JUAN ALBERTO Lawrence will be present for [facility] hospice admission scheduled for Friday 7/17, hospice RN will arrive at home between 1-2 pm.   Verbal information regarding [facility] services provided to patient/caregiver along with [facility] contact information. Teach back demonstrated by patient/caregiver.  Address and phone number confirmed with patient/caregiver. Service address is [address] [phone].    Please discharge patient home with any prescriptions and/or supplies needed for comfort.  Transfer checklist placed on patient chart for reference.  Written Epic hospice order placed.  Liaison will continue to follow until discharge.   Anticipated dc date is 7/17 11:00 am.         Thank you for the referral,  Aileen Gibson RN  Hospice & Palliative Care Liaison  [phone]     normal

## 2021-02-06 NOTE — ED PROVIDER NOTE - ENMT, MLM
Airway patent, Nasal mucosa clear. Mouth with normal mucosa. Airway patent, Nasal mucosa clear. Mouth with normal mucosa. +mild ttp with bruising noted to lateral R eye, skin intact, no other facial injuries noted, nasal bones NT, able to open mouth without difficulty

## 2021-02-06 NOTE — ED PROVIDER NOTE - NSFOLLOWUPINSTRUCTIONS_ED_ALL_ED_FT
Follow up with your PMD in 1-2 days for re-evaluation, ongoing care and treatment. Rest, drink plenty of fluids, tylenol 650mg every 6 hours as needed for pain. If having worsening of symptoms or other related symptoms, RETURN TO THE ER IMMEDIATELY.       Syncope    WHAT YOU NEED TO KNOW:    Syncope is also called fainting or passing out. Syncope is a sudden, temporary loss of consciousness, followed by a fall from a standing or sitting position. Syncope ranges from not serious to a sign of a more serious condition that needs to be treated. You can control some health conditions that cause syncope. Your healthcare providers can help you create a plan to manage syncope and prevent episodes.    DISCHARGE INSTRUCTIONS:    Seek care immediately if:   •You are bleeding because you hit your head when you fainted.       •You suddenly have double vision, difficulty speaking, numbness, and cannot move your arms or legs.      •You have chest pain and trouble breathing.      •You vomit blood or material that looks like coffee grounds.      •You see blood in your bowel movement.      Contact your healthcare provider if:   •You have new or worsening symptoms.      •You have another syncope episode.      •You have a headache, fast heartbeat, or feel too dizzy to stand up.      •You have questions or concerns about your condition or care.      Medicines:   •Medicines may be needed to help your heart pump strongly and regularly. Your healthcare provider may also make changes to any medicines that are causing syncope.       •Take your medicine as directed. Contact your healthcare provider if you think your medicine is not helping or if you have side effects. Tell him or her if you are allergic to any medicine. Keep a list of the medicines, vitamins, and herbs you take. Include the amounts, and when and why you take them. Bring the list or the pill bottles to follow-up visits. Carry your medicine list with you in case of an emergency.      Follow up with your healthcare provider as directed: Write down your questions so you remember to ask them during your visits.     Manage syncope:   •Keep a record of your syncope episodes. Include your symptoms and your activity before and after the episode. The record can help your healthcare provider find the cause of your syncope and help you manage episodes.      •Sit or lie down when needed. This includes when you feel dizzy, your throat is getting tight, and your vision changes. Raise your legs above the level of your heart.      •Take slow, deep breaths if you start to breathe faster with anxiety or fear. This can help decrease dizziness and the feeling that you might faint.       •Check your blood pressure often. This is important if you take medicine to lower your blood pressure. Check your blood pressure when you are lying down and when you are standing. Ask how often to check during the day. Keep a record of your blood pressure numbers. Your healthcare provider may use the record to help plan your treatment.  How to take a Blood Pressure           Prevent a syncope episode:   •Move slowly and let yourself get used to one position before you move to another position. This is very important when you change from a lying or sitting position to a standing position. Take some deep breaths before you stand up from a lying position. Stand up slowly. Sudden movements may cause a fainting spell. Sit on the side of the bed or couch for a few minutes before you stand up. If you are on bedrest, try to be upright for about 2 hours each day, or as directed. Do not lock your legs if you are standing for a long period of time. Move your legs and bend your knees to keep blood flowing.      •Follow your healthcare provider's recommendations. Your provider may recommend that you drink more liquids to prevent dehydration. You may also need to have more salt to keep your blood pressure from dropping too low and causing syncope. Your provider will tell you how much liquid and sodium to have each day. He or she will also tell you how much physical activity is safe for you. This will depend on what is causing your syncope.      •Watch for signs of low blood sugar. These include hunger, nervousness, sweating, and fast or fluttery heartbeats. Talk with your healthcare provider about ways to keep your blood sugar level steady.      •Do not strain if you are constipated. You may faint if you strain to have a bowel movement. Walking is the best way to get your bowels moving. Eat foods high in fiber to make it easier to have a bowel movement. Good examples are high-fiber cereals, beans, vegetables, and whole-grain breads. Prune juice may help make bowel movements softer.      •Be careful in hot weather. Heat can cause a syncope episode. Limit activity done outside on hot days. Physical activity in hot weather can lead to dehydration. This can cause an episode.

## 2021-02-06 NOTE — ED PROVIDER NOTE - OBJECTIVE STATEMENT
34 y/o M with hx of Bipolar 1 disorder  HTN (hypertension)  Mental retardation  Multiple personality disorder bib staff member from [facility] s/p syncope today. Pt states that he was sitting on the toilet when he started feeling dizzy and fell forward and passed out, hitting his head on the tilet floor in the bathroom. States that he got himself up after and laid down on his bed and then informed his supervisor. States that he has mild discomfort to his R temple/lateral eye and posterior scalp. States that he feels fine otherwise. Denies any dizziness or weakness currently. Denies fever, chills, N/V, vision changes, numbness, tingling, focal weakness, neck/back/hip pain, open wounds, CP, SOB, hx of syncope, palpitations, cough or other symptoms. 34 y/o M with hx of Bipolar 1 disorder  HTN (hypertension)  Mental retardation  Multiple personality disorder bib staff member from [facility] s/p syncope today. Pt states that he was sitting on the toilet when he started feeling dizzy and fell forward and passed out, hitting his head on the tilet floor in the bathroom. States that he got himself up after and laid down on his bed and then informed his supervisor. States that he has mild discomfort to his R temple/lateral eye and posterior scalp. States that he feels fine otherwise. Denies any dizziness or weakness currently. Denies fever, chills, N/V, vision changes, numbness, tingling, focal weakness, neck/back/hip pain, open wounds, CP, SOB, hx of syncope, palpitations, cough, use of blood thinners or other symptoms.

## 2021-02-06 NOTE — ED ADULT NURSE REASSESSMENT NOTE - NS ED NURSE REASSESS COMMENT FT1
Received pt in stretcher, pt alert and oriented with aide at bedside, no distress noted, pt denies pain/discomfort/SOB/dizziness at this time, plan of care explained. Safety check completed and maintained, fall risk precautions in place.

## 2021-02-06 NOTE — ED PROVIDER NOTE - SKIN, MLM
Skin normal color for race, warm, dry and intact. No evidence of rash. +mild abrasion noted to R parietal scalp, no lacerations or active bleeding noted

## 2021-02-06 NOTE — ED ADULT NURSE NOTE - CHPI ED NUR SYMPTOMS NEG
no back pain/no chest pain/no chills/no congestion/no dizziness/no fever/no nausea/no shortness of breath/no vomiting

## 2021-02-11 ENCOUNTER — RX RENEWAL (OUTPATIENT)
Age: 36
End: 2021-02-11

## 2021-02-18 ENCOUNTER — APPOINTMENT (OUTPATIENT)
Dept: PEDIATRIC ALLERGY IMMUNOLOGY | Facility: CLINIC | Age: 36
End: 2021-02-18

## 2021-02-25 ENCOUNTER — APPOINTMENT (OUTPATIENT)
Dept: PEDIATRIC ALLERGY IMMUNOLOGY | Facility: CLINIC | Age: 36
End: 2021-02-25

## 2021-03-08 ENCOUNTER — LABORATORY RESULT (OUTPATIENT)
Age: 36
End: 2021-03-08

## 2021-03-08 ENCOUNTER — APPOINTMENT (OUTPATIENT)
Dept: PEDIATRIC ALLERGY IMMUNOLOGY | Facility: CLINIC | Age: 36
End: 2021-03-08
Payer: MEDICAID

## 2021-03-08 VITALS
TEMPERATURE: 96.3 F | OXYGEN SATURATION: 96 % | HEIGHT: 71 IN | DIASTOLIC BLOOD PRESSURE: 87 MMHG | WEIGHT: 260 LBS | BODY MASS INDEX: 36.4 KG/M2 | SYSTOLIC BLOOD PRESSURE: 147 MMHG | HEART RATE: 89 BPM

## 2021-03-08 PROCEDURE — 99214 OFFICE O/P EST MOD 30 MIN: CPT

## 2021-03-08 NOTE — HISTORY OF PRESENT ILLNESS
[Asthma] : asthma [Venom Reactions] : venom reactions [Food Allergies] : food allergies [de-identified] : 35 year old male presents with chronic rhinitis and postnasal drip:\par \par Patient feels well controlled on Fluticasone and Azelastine nose spray and Cetirizine. Denies use of any antihistamines in the past week in preparation for retesting. His skin test in 2020 was negative suggestive of non-allergic rhinitis, as he reported symptoms of nasal congestion, runny nose and postnasal drip.\par Patient lives in a group home. No carpets. No second hand smoke exposure.

## 2021-03-08 NOTE — PHYSICAL EXAM

## 2021-03-08 NOTE — REASON FOR VISIT
[Routine Follow-Up] : a routine follow-up visit for [FreeTextEntry2] : chronic rhinitis, postnasal drip

## 2021-03-08 NOTE — CONSULT LETTER
[Dear  ___] : Dear ~CAYLA, [Please see my note below.] : Please see my note below. [Consult Closing:] : Thank you very much for allowing me to participate in the care of this patient.  If you have any questions, please do not hesitate to contact me. [Sincerely,] : Sincerely, [Courtesy Letter:] : I had the pleasure of seeing your patient, [unfilled], in my office today. [FreeTextEntry2] : FANY CORONADO NP [FreeTextEntry3] : Bere Fitzgerald MD\par Attending Physician, Division of Allergy and Immunology\par , Departments of Medicine and Pediatrics\par [employer] [facility]\par Kingsley Fonseca [facility] \par [facility]

## 2021-03-08 NOTE — REVIEW OF SYSTEMS
[Rhinorrhea] : rhinorrhea [Nasal Congestion] : nasal congestion [Sneezing] : sneezing [Dry Skin] : ~L dry skin [Nl] : Genitourinary [Immunizations are up to date] : Immunizations are up to date [Received Influenza Vaccine this Past Year] : patient has received the Influenza vaccine this past year [FreeTextEntry4] : well controlled on current regimen

## 2021-03-08 NOTE — ED ADULT TRIAGE NOTE - MEANS OF ARRIVAL
Chief Complaint: Shoulder Pain (left side - no injury. Worse with reaching behind and painful ROM)       HPI:  Michi Wright is a 45 y.o. male here with c/o pain in his left shoulder ongoing since 1 month. It gradually started. He has been lifting heavy weights at his work. Denies any injury    He has a history of gout but denies any flareup in the last week    His blood pressure has been elevated. Has strong family history of high blood pressure  He has gained weight in the recent past.  And has not checked his blood pressure at home    ROS:  Constitutional: Negative   Respiratory: Negative for cough, chest tightness, shortness of breath and wheezing. Cardiovascular: Negative for chest pain, palpitations and leg swelling. Gastrointestinal: Negative for abdominal pain, blood in stool, constipation, diarrhea, nausea and vomiting. Genitourinary: Negative for difficulty urinating, flank pain, frequency, hematuria and urgency. Musculoskeletal: As mentioned above    Patient's problem list, medications, allergies, past medical, surgical, social and family histories were reviewed and updated as appropriate. Current Outpatient Medications   Medication Sig Dispense Refill    Naproxen Sodium (ALEVE PO) Take by mouth       No current facility-administered medications for this visit. Social History     Tobacco Use    Smoking status: Never Smoker    Smokeless tobacco: Never Used   Substance Use Topics    Alcohol use: Yes     Comment: seldom        Objective:     Vitals:    03/08/21 1627   BP: (!) 142/88   Pulse: 108   SpO2: 96%   Weight: 236 lb (107 kg)   Height: 5' 11\" (1.803 m)     Body mass index is 32.92 kg/m².      Wt Readings from Last 3 Encounters:   03/08/21 236 lb (107 kg)   05/15/19 233 lb 12.8 oz (106.1 kg)   03/19/19 232 lb 12.8 oz (105.6 kg)     BP Readings from Last 3 Encounters:   03/08/21 (!) 142/88   05/15/19 130/84   03/19/19 (!) 130/100       Physical exam:  Constitutional: ambulatory

## 2021-03-10 ENCOUNTER — NON-APPOINTMENT (OUTPATIENT)
Age: 36
End: 2021-03-10

## 2021-03-10 LAB
A ALTERNATA IGE QN: <0.1 KUA/L
A FUMIGATUS IGE QN: <0.1 KUA/L
C HERBARUM IGE QN: <0.1 KUA/L
C LUNATA IGE QN: <0.1 KUA/L
CAT DANDER IGE QN: <0.1 KUA/L
CMN PIGWEED IGE QN: <0.1 KUA/L
COCKLEBUR IGE QN: <0.1 KUA/L
COCKSFOOT IGE QN: <0.1 KUA/L
COMMON RAGWEED IGE QN: <0.1 KUA/L
D FARINAE IGE QN: <0.1 KUA/L
D PTERONYSS IGE QN: <0.1 KUA/L
DEPRECATED A ALTERNATA IGE RAST QL: 0
DEPRECATED A FUMIGATUS IGE RAST QL: 0
DEPRECATED A PULLULANS IGE RAST QL: 0
DEPRECATED C HERBARUM IGE RAST QL: 0
DEPRECATED C LUNATA IGE RAST QL: 0
DEPRECATED CAT DANDER IGE RAST QL: 0
DEPRECATED COCKLEBUR IGE RAST QL: 0
DEPRECATED COCKSFOOT IGE RAST QL: 0
DEPRECATED COMMON PIGWEED IGE RAST QL: 0
DEPRECATED COMMON RAGWEED IGE RAST QL: 0
DEPRECATED D FARINAE IGE RAST QL: 0
DEPRECATED D PTERONYSS IGE RAST QL: 0
DEPRECATED DOG DANDER IGE RAST QL: 0
DEPRECATED ENGL PLANTAIN IGE RAST QL: 0
DEPRECATED F MONILIFORME IGE RAST QL: 0
DEPRECATED GIANT RAGWEED IGE RAST QL: 0
DEPRECATED GOOSE FEATHER IGE RAST QL: 0
DEPRECATED GOOSEFOOT IGE RAST QL: NORMAL
DEPRECATED KENT BLUE GRASS IGE RAST QL: 0
DEPRECATED LONDON PLANE IGE RAST QL: 0
DEPRECATED MUGWORT IGE RAST QL: 0
DEPRECATED P NOTATUM IGE RAST QL: 0
DEPRECATED R NIGRICANS IGE RAST QL: 0
DEPRECATED RED CEDAR IGE RAST QL: 0
DEPRECATED RED TOP GRASS IGE RAST QL: 0
DEPRECATED ROACH IGE RAST QL: 0
DEPRECATED RYE IGE RAST QL: 0
DEPRECATED SILVER BIRCH IGE RAST QL: 0
DEPRECATED TIMOTHY IGE RAST QL: 0
DEPRECATED WHITE ASH IGE RAST QL: 0
DEPRECATED WHITE HICKORY IGE RAST QL: 0
DEPRECATED WHITE OAK IGE RAST QL: 0
DOG DANDER IGE QN: <0.1 KUA/L
ENGL PLANTAIN IGE QN: <0.1 KUA/L
F MONILIFORME IGE QN: <0.1 KUA/L
GIANT RAGWEED IGE QN: <0.1 KUA/L
GOOSE FEATHER IGE QN: <0.1 KUA/L
GOOSEFOOT IGE QN: 0.1 KUA/L
GRAY ALDER (T2) CLASS: 0
GRAY ALDER (T2) CONC: <0.1 KUA/L
KENT BLUE GRASS IGE QN: <0.1 KUA/L
LONDON PLANE IGE QN: <0.1 KUA/L
MOLD (AUREOBASIDIUM M12) CONC: <0.1 KUA/L
MUGWORT IGE QN: <0.1 KUA/L
MULBERRY (T70) CLASS: 0
MULBERRY (T70) CONC: <0.1 KUA/L
P NOTATUM IGE QN: <0.1 KUA/L
R NIGRICANS IGE QN: <0.1 KUA/L
RED CEDAR IGE QN: <0.1 KUA/L
RED TOP GRASS IGE QN: <0.1 KUA/L
ROACH IGE QN: <0.1 KUA/L
RYE IGE QN: <0.1 KUA/L
SILVER BIRCH IGE QN: <0.1 KUA/L
TIMOTHY IGE QN: <0.1 KUA/L
WHITE ASH IGE QN: <0.1 KUA/L
WHITE ELM IGE QN: 0
WHITE ELM IGE QN: <0.1 KUA/L
WHITE HICKORY IGE QN: <0.1 KUA/L
WHITE OAK IGE QN: <0.1 KUA/L

## 2021-04-16 ENCOUNTER — RX RENEWAL (OUTPATIENT)
Age: 36
End: 2021-04-16

## 2021-06-04 ENCOUNTER — RX RENEWAL (OUTPATIENT)
Age: 36
End: 2021-06-04

## 2021-10-18 ENCOUNTER — RX RENEWAL (OUTPATIENT)
Age: 36
End: 2021-10-18

## 2021-12-20 NOTE — ED PROVIDER NOTE - CROS ED HEME ALL NEG
[FreeTextEntry1] : \par 83 yo M with CAD s/p PCI RCA/LAD 2010, newly diagnosed paroxysmal atrial fibrillation (CHADSVASC 5), HTN, HLD, NIDDM2, PAD (severe left ICA stenosis), former smoker, obesity, mod AI here for cv follow up.\par \par Last visit, testing was ordered.  Interim, he underwent testing.  He has stable mild dyspnea and no other active CV symptoms.\par \par Recommended 3-day monitor, patient was hesitant but underwent.  Results now pending.  He declined sleep study.\par \par \par TESTING:\par \par 12/2021:\par Nuclear stress test: No ischemia.  Preserved function.  Atrial fibrillation noted on EKG tracings.\par TTE: Mild mitral valve disease.  Moderate aortic regurgitation.  Mildly dilated left atrium.  Preserved biventricular function. G1DD.  Normal PASP.\par Carotid duplex: Left 70-99%. mild JOSUE\par Labwork: Grossly normal CBC, CMP.  Creatinine 1.21.  Normal TFT.  LDL 78.  A1c 7.0.  \par \par 2019 TTE preserved function. mild LAE\par LE art u/s: mild disease\par labs normal cbc/lft. cr 1.17. ldl 66 \par \par 2016 stress echo no ischemia but difficult study and 3 minutes exercise\par \par 2015 no AAA\par \par Cath 2014 patent stents. mid LAD 30%.\par \par Cath 2010 PCI RCA 3.0x28 [employer] and mid LAD 3.0x15 BMS negative...

## 2022-03-15 ENCOUNTER — RX RENEWAL (OUTPATIENT)
Age: 37
End: 2022-03-15

## 2022-03-30 NOTE — ED PROVIDER NOTE - CROS ED NEURO ALL NEG
negative... Ear Star Wedge Flap Text: The defect edges were debeveled with a #15 blade scalpel.  Given the location of the defect and the proximity to free margins (helical rim) an ear star wedge flap was deemed most appropriate.  Using a sterile surgical marker, the appropriate flap was drawn incorporating the defect and placing the expected incisions between the helical rim and antihelix where possible.  The area thus outlined was incised through and through with a #15 scalpel blade.

## 2022-04-05 ENCOUNTER — APPOINTMENT (OUTPATIENT)
Dept: PEDIATRIC ALLERGY IMMUNOLOGY | Facility: CLINIC | Age: 37
End: 2022-04-05
Payer: MEDICAID

## 2022-04-05 VITALS — DIASTOLIC BLOOD PRESSURE: 87 MMHG | OXYGEN SATURATION: 97 % | SYSTOLIC BLOOD PRESSURE: 133 MMHG | HEART RATE: 76 BPM

## 2022-04-05 PROCEDURE — 99213 OFFICE O/P EST LOW 20 MIN: CPT

## 2022-04-05 RX ORDER — AZELASTINE HYDROCHLORIDE 137 UG/1
0.1 SPRAY, METERED NASAL TWICE DAILY
Qty: 1 | Refills: 3 | Status: COMPLETED | COMMUNITY
Start: 2020-02-18 | End: 2022-04-05

## 2022-04-05 NOTE — HISTORY OF PRESENT ILLNESS
[Asthma] : asthma [Venom Reactions] : venom reactions [Food Allergies] : food allergies [de-identified] : 36 year old male presents with chronic rhinitis and postnasal drip:\par \par Patient feels that his symptoms of nasal congestion and postnasal drip respond to the use of Flonase/ Fluticasone. He takes Zyrtec only as needed, and doesn't use Azelastine nose spray. His skin test in 2020 was negative, Immuno[employer] testing in 2021 was also negative to environmental allergens. He hasn't been seen by ENT before.\par Patient lives in a group home. No carpets. No second hand smoke exposure.

## 2022-04-05 NOTE — CONSULT LETTER
[Dear  ___] : Dear ~CAYLA, [Courtesy Letter:] : I had the pleasure of seeing your patient, [unfilled], in my office today. [Please see my note below.] : Please see my note below. [Consult Closing:] : Thank you very much for allowing me to participate in the care of this patient.  If you have any questions, please do not hesitate to contact me. [Sincerely,] : Sincerely, [FreeTextEntry2] : FANY CORONADO NP [FreeTextEntry3] : Bere Fitzgerald MD\par Attending Physician, Division of Allergy and Immunology\par , Departments of Medicine and Pediatrics\par [employer] [facility]\par [facility] \par [facility]

## 2022-04-05 NOTE — PHYSICAL EXAM
[Alert] : alert [Well Nourished] : well nourished [Healthy Appearance] : healthy appearance [No Acute Distress] : no acute distress [Well Developed] : well developed [Normal Pupil & Iris Size/Symmetry] : normal pupil and iris size and symmetry [No Discharge] : no discharge [No Photophobia] : no photophobia [Sclera Not Icteric] : sclera not icteric [Normal Lips/Tongue] : the lips and tongue were normal [Normal Outer Ear/Nose] : the ears and nose were normal in appearance [No Nasal Discharge] : no nasal discharge [Supple] : the neck was supple [Normal Rate and Effort] : normal respiratory rhythm and effort [No Crackles] : no crackles [No Retractions] : no retractions [Bilateral Audible Breath Sounds] : bilateral audible breath sounds [Normal Rate] : heart rate was normal  [Normal S1, S2] : normal S1 and S2 [No murmur] : no murmur [Regular Rhythm] : with a regular rhythm [Normal Cervical Lymph Nodes] : cervical [Skin Intact] : skin intact  [No Rash] : no rash [No Skin Lesions] : no skin lesions [No Edema] : no edema [No Cyanosis] : no cyanosis [Normal Mood] : mood was normal [Normal Affect] : affect was normal [Alert, Awake, Oriented as Age-Appropriate] : alert, awake, oriented as age appropriate [Conjunctival Erythema] : no conjunctival erythema [Wheezing] : no wheezing was heard [Patches] : no patches

## 2022-04-22 ENCOUNTER — RX RENEWAL (OUTPATIENT)
Age: 37
End: 2022-04-22

## 2022-06-14 ENCOUNTER — RX RENEWAL (OUTPATIENT)
Age: 37
End: 2022-06-14

## 2022-08-16 ENCOUNTER — RX RENEWAL (OUTPATIENT)
Age: 37
End: 2022-08-16

## 2022-09-15 ENCOUNTER — RX RENEWAL (OUTPATIENT)
Age: 37
End: 2022-09-15

## 2022-11-16 ENCOUNTER — RX RENEWAL (OUTPATIENT)
Age: 37
End: 2022-11-16

## 2022-11-16 RX ORDER — CETIRIZINE HYDROCHLORIDE 10 MG/1
10 TABLET, COATED ORAL
Qty: 30 | Refills: 1 | Status: ACTIVE | COMMUNITY
Start: 2021-10-18 | End: 1900-01-01

## 2022-12-06 NOTE — ED PROVIDER NOTE - CROS ED MUSC ALL NEG
[Person] : oriented to person [Place] : oriented to place [Time] : oriented to time [Short Term Intact] : short term memory intact [Remote Intact] : remote memory intact [Registration Intact] : recent registration memory intact [Cranial Nerves Optic (II)] : visual acuity intact bilaterally,  visual fields full to confrontation, pupils equal round and reactive to light [Cranial Nerves Oculomotor (III)] : extraocular motion intact [Cranial Nerves Facial (VII)] : face symmetrical [Motor Tone] : muscle tone was normal in all four extremities [Motor Strength] : muscle strength was normal in all four extremities [Involuntary Movements] : no involuntary movements were seen negative...

## 2023-01-24 NOTE — ED PROVIDER NOTE - PATIENT PORTAL LINK FT
clear You can access the FollowMyHealth Patient Portal offered by [facility] by registering at the following website: [email or web address]. By joining [employer]’s FollowMyHealth portal, you will also be able to view your health information using other applications (apps) compatible with our system.

## 2023-05-12 ENCOUNTER — RX RENEWAL (OUTPATIENT)
Age: 38
End: 2023-05-12

## 2023-07-18 ENCOUNTER — APPOINTMENT (OUTPATIENT)
Dept: PEDIATRIC ALLERGY IMMUNOLOGY | Facility: CLINIC | Age: 38
End: 2023-07-18

## 2023-08-01 ENCOUNTER — APPOINTMENT (OUTPATIENT)
Dept: PEDIATRIC ALLERGY IMMUNOLOGY | Facility: CLINIC | Age: 38
End: 2023-08-01
Payer: MEDICAID

## 2023-08-01 VITALS
SYSTOLIC BLOOD PRESSURE: 117 MMHG | HEIGHT: 70.98 IN | DIASTOLIC BLOOD PRESSURE: 78 MMHG | WEIGHT: 315 LBS | HEART RATE: 69 BPM | BODY MASS INDEX: 44.1 KG/M2

## 2023-08-01 PROCEDURE — 99214 OFFICE O/P EST MOD 30 MIN: CPT

## 2023-08-01 RX ORDER — FLUTICASONE PROPIONATE 50 UG/1
50 SPRAY, METERED NASAL
Qty: 1 | Refills: 3 | Status: ACTIVE | COMMUNITY
Start: 2020-06-15

## 2023-08-01 NOTE — REVIEW OF SYSTEMS
[Rhinorrhea] : rhinorrhea [Nasal Congestion] : nasal congestion [Sneezing] : sneezing [Dry Skin] : ~L dry skin [Nl] : Genitourinary [Immunizations are up to date] : Immunizations are up to date [FreeTextEntry4] : well controlled on current regimen

## 2023-08-01 NOTE — CONSULT LETTER
[Dear  ___] : Dear ~CAYLA, [Courtesy Letter:] : I had the pleasure of seeing your patient, [unfilled], in my office today. [Please see my note below.] : Please see my note below. [Consult Closing:] : Thank you very much for allowing me to participate in the care of this patient.  If you have any questions, please do not hesitate to contact me. [Sincerely,] : Sincerely, [FreeTextEntry2] : FANY CORONADO NP [FreeTextEntry3] : Bere Fitzgerald MD\par  Attending Physician, Division of Allergy and Immunology\par  , Departments of Medicine and Pediatrics\par  [employer] [facility]\par  [facility] \par  [facility]  [DrArnaldo  ___] : Dr. COBOS

## 2023-08-01 NOTE — PHYSICAL EXAM
[Alert] : alert [Well Nourished] : well nourished [Healthy Appearance] : healthy appearance [No Acute Distress] : no acute distress [Well Developed] : well developed [Normal Pupil & Iris Size/Symmetry] : normal pupil and iris size and symmetry [No Discharge] : no discharge [No Photophobia] : no photophobia [Sclera Not Icteric] : sclera not icteric [Normal Lips/Tongue] : the lips and tongue were normal [Normal Outer Ear/Nose] : the ears and nose were normal in appearance [No Nasal Discharge] : no nasal discharge [Supple] : the neck was supple [Normal Rate and Effort] : normal respiratory rhythm and effort [No Crackles] : no crackles [No Retractions] : no retractions [Bilateral Audible Breath Sounds] : bilateral audible breath sounds [Normal Rate] : heart rate was normal  [Normal S1, S2] : normal S1 and S2 [No murmur] : no murmur [Regular Rhythm] : with a regular rhythm [Normal Cervical Lymph Nodes] : cervical [Skin Intact] : skin intact  [No Rash] : no rash [No Skin Lesions] : no skin lesions [No Edema] : no edema [No Cyanosis] : no cyanosis [Normal Mood] : mood was normal [Normal Affect] : affect was normal [Alert, Awake, Oriented as Age-Appropriate] : alert, awake, oriented as age appropriate [Conjunctival Erythema] : no conjunctival erythema [Wheezing] : no wheezing was heard

## 2023-08-01 NOTE — HISTORY OF PRESENT ILLNESS
[Asthma] : asthma [Venom Reactions] : venom reactions [Food Allergies] : food allergies [de-identified] : 36 year old male presents with chronic rhinitis and postnasal drip. Patient lives in a group home.  Patient reports symptoms of intermittent nasal congestion and sneezing all year around. There is no seasonality of symptoms.  Patient feels that his symptoms of nasal congestion and postnasal drip respond to the use of Flonase/ Fluticasone. He takes Zyrtec only as needed. His skin test in 2020 was negative, ImmunoCap testing in 2021 was also negative to environmental allergens.   No carpets. No second hand smoke exposure. He was seen by ENT Dr. Jessica Little on 05/26/22, his rhinoscopy was unrevealing, use of Fluticasone nose spray and nasal saline rinses was recommended.

## 2023-08-01 NOTE — SOCIAL HISTORY
[Cat] : cat [Bedroom] : not in the bedroom [Living Area] : not in the living area [Smokers in Household] : there are no smokers in the home [FreeTextEntry1] : lives in a group home

## 2023-08-11 ENCOUNTER — RX RENEWAL (OUTPATIENT)
Age: 38
End: 2023-08-11

## 2023-08-25 ENCOUNTER — APPOINTMENT (OUTPATIENT)
Dept: PEDIATRIC ALLERGY IMMUNOLOGY | Facility: CLINIC | Age: 38
End: 2023-08-25
Payer: MEDICAID

## 2023-08-25 DIAGNOSIS — J31.0 CHRONIC RHINITIS: ICD-10-CM

## 2023-08-25 DIAGNOSIS — R09.82 POSTNASAL DRIP: ICD-10-CM

## 2023-08-25 PROCEDURE — 99441: CPT

## 2023-12-12 ENCOUNTER — RX RENEWAL (OUTPATIENT)
Age: 38
End: 2023-12-12

## 2024-01-01 NOTE — ED ADULT NURSE NOTE - SUICIDE SCREENING QUESTION 1
-Lay baby on back to sleep: firm mattress, no bumpers, pillows or things other than a blanket in crib.
No

## 2024-01-13 NOTE — DATA REVIEWED
IP MH Referral Acuity Rating Score (RARS)    LMHP complete at referral to IP MH, with DEC; and, daily while awaiting IP MH placement. Call score to PPS.  CRITERIA SCORING   New 72 HH and Involuntary for IP MH (not adolescent) 0/1   Boarding over 24 hours 1/1   Vulnerable adult at least 55+ with multiple co morbidities; or, Patient age 11 or under 0/1   Suicide ideation without relief of precipitating factors 1/1   Current plan for suicide 1/1   Current plan for homicide 0/1   Imminent risk or actual attempt to seriously harm another without relief of factors precipitating the attempt 0/1   Severe dysfunction in daily living (ex: complete neglect for self care, extreme disruption in vegetative function, extreme deterioration in social interactions) 0/1   Recent (last 2 weeks) or current physical aggression in the ED 0/1   Restraints or seclusion episode in ED 0/1   Verbal aggression, agitation, yelling, etc., while in the ED 0/1   Active psychosis with psychomotor agitation or catatonia 0/1   Need for constant or near constant redirection (from leaving, from others, etc).  0/1   Intrusive or disruptive behaviors 0/1   TOTAL Acuity Total Score: 3        [No studies available for review at this time.] : No studies available for review at this time.

## 2024-03-12 ENCOUNTER — RX RENEWAL (OUTPATIENT)
Age: 39
End: 2024-03-12

## 2024-03-26 ENCOUNTER — NON-APPOINTMENT (OUTPATIENT)
Age: 39
End: 2024-03-26

## 2024-03-26 DIAGNOSIS — B35.3 TINEA PEDIS: ICD-10-CM

## 2024-03-26 RX ORDER — TOLNAFTATE 1 G/100G
CREAM TOPICAL
Refills: 0 | Status: ACTIVE | COMMUNITY

## 2024-03-26 RX ORDER — CLOTRIMAZOLE AND BETAMETHASONE DIPROPIONATE 10; .5 MG/G; MG/G
1-0.05 CREAM TOPICAL
Refills: 0 | Status: ACTIVE | COMMUNITY

## 2024-03-26 RX ORDER — PROPRANOLOL HYDROCHLORIDE 80 MG/1
TABLET ORAL
Refills: 0 | Status: ACTIVE | COMMUNITY

## 2024-03-26 RX ORDER — QUETIAPINE FUMARATE 400 MG/1
TABLET ORAL
Refills: 0 | Status: ACTIVE | COMMUNITY

## 2024-03-26 RX ORDER — RISPERIDONE 0.5 MG/1
TABLET, FILM COATED ORAL
Refills: 0 | Status: ACTIVE | COMMUNITY

## 2024-03-26 RX ORDER — LITHIUM CARBONATE 300 MG/1
TABLET ORAL
Refills: 0 | Status: ACTIVE | COMMUNITY

## 2024-03-26 RX ORDER — NAPROXEN 500 MG/1
TABLET ORAL
Refills: 0 | Status: ACTIVE | COMMUNITY

## 2024-04-11 ENCOUNTER — APPOINTMENT (OUTPATIENT)
Dept: PODIATRY | Facility: CLINIC | Age: 39
End: 2024-04-11

## 2024-05-13 ENCOUNTER — NON-APPOINTMENT (OUTPATIENT)
Age: 39
End: 2024-05-13

## 2024-05-13 ENCOUNTER — RX RENEWAL (OUTPATIENT)
Age: 39
End: 2024-05-13

## 2024-05-13 DIAGNOSIS — B35.1 TINEA UNGUIUM: ICD-10-CM

## 2024-05-13 DIAGNOSIS — M79.674 PAIN IN RIGHT TOE(S): ICD-10-CM

## 2024-05-13 RX ORDER — FLUTICASONE PROPIONATE 50 UG/1
50 SPRAY, METERED NASAL
Qty: 9.9 | Refills: 3 | Status: ACTIVE | COMMUNITY
Start: 2022-03-15 | End: 1900-01-01

## 2024-09-13 NOTE — ED ADULT NURSE NOTE - NS ED NOTE ABUSE RESPONSE YN
[Disease: _____________________] : Disease: [unfilled] [T: ___] : T[unfilled] [N: ___] : N[unfilled] [M: ___] : M[unfilled] [AJCC Stage: ____] : AJCC Stage: [unfilled] [de-identified] : 61yo F with Stage IB T2N0Mx LEFT ILC (ER+/WI+/HER2-) s/p 12/19/23 left lumpectomy/SLNB, adjuvant RT (completed 3/5/24), and adjuvant anastrozole (3/2024-present) is here for f/u.  Fhx of breast cancer in paternal aunt (age unknown).  Hx of R benign US bx 6/28/13. She has hx of R excisional bx x2 in 12/29/2010 for FA 2:00 and FA w/ focal cellular stroma 4:00.  8/16/21: B/l MG & US ([facility])- heterogeneously dense. R stable bx clip 12-1:00. R stable postsurgical change LIQ. US- R 0.6cm stable hypoechoic structure bx benign 12:00 7FN. BI-RADS 2  8/18/22: B/l MG & US- heterogeneously dense. R bx clip. R postsurgical scar LIQ. US- B/l multiple complex cysts. BI-RADS 2  9/8/23: B/l MG & US- heterogeneously dense. R bx clip 11-12:00. US- R 0.5cm bc mass 12:00 8FN. L 0.5cm hypoechoic mass w/ irregular margins 11:00 4FN (was 0.3cm in 2022- rec us bx). BI-RADS 4  10/13/23: L US bx 0.5cm mass 11:00 4FN (heart clip)- ILC, pleomorphic type. ER+ (>90%), WI+ (>90%), HER2- (0 stain). Concordant  11/1/23: MRI- L 0.9cm irregular shaped mass w/ bx clip proven ILC 11:00. L 1cm clumped NME 2-3:00 (rec MR bx). R 0.9cm irregular mass 12-1:00 medial (rec MR bx). R 1.2cm region linear NME 12:00 anterior (rec MR bx). B/l no significant axillary or internal mammary lymphadenopathy. BI-RADS 4.  11/21/23: B/l MR bx x3 SITE 1) L NME 2-3:00 (hourglass clip)- Benign breast parenchyma w/ cystic apocrine metaplasia and UDH, microcalcs associated w/ epithelium. Benign & Concordant. SITE 2) R enhancing mass 12-1:00 (hourglass clip)- small focus LCIS (classic type), stromal fibrosis, UDH and apocrine metaplasia, microcalcs associated w/ LCIS and benign epithelium. High Risk & Concordant, there is a small focus, this may be an incidental lesion. Rec 6M f/u MRI if surgical excision is deferred. SITE 3) R NME 12:00 (buckle clip)- benign parenchyma w/ minute IDP, UDH and stromal fibrosis, microcalcs assoicated w/ epithelium. High risk due to presence of a papilloma; however papilloma is minute therefore rec 6M f/u.  12/19/23: L nloc lumpectomy & SNB- separate areas of ILC (pleomorphic type) ranging from microinvasive foci to largest present in 6 contiguous slices (24 mm), present at bx site changes HER2 negative (1+, 20%), LCIS, no lymphatic invasion identified, all final margins negative, 0/2 sentinel LN negative for tumor, 0/2 palpable LN negative for tumor.  2/14/24 DEXA - normal bone density   5/16/24 MRI breast: Postlumpectomy and radiation therapy changes on the left. Possible postoperative enhancing fat necrosis just inferior to the lumpectomy site, in the left breast 11:00, at the level the nipple. There are foci of background enhancement bilaterally, a follow-up bilateral study is recommended in 6 months.   [de-identified] : poorly differentiated invasive lobular carcinoma [de-identified] : ER+ (>90%), KY+ (>90%), HER2- (0-1), Oncotype RS 12 [de-identified] : 11/2023 [facility] genetic testing negative [de-identified] : She is taking anastrozole, calcium, Vitamin D daily and walks 30 mins a day for exercise.  She reports hot flashes are more intense, but are sporadic.  She does not want to take medications for hot flashes.  She reports waves of nausea since starting anastrozole, getting better, lasts 1-2 mins, not triggered by specific foods.  The nausea causes decreased appetite and resulting weight loss.  She reports the eats three meals a day, small portions.  She was also severely constipated in the beginning, tried various remedies, now has her regular BMs several times a day by eating prunes.  She has occasional, transient joint aches in the left hip, left shoulder, feet.  Under the left ribs she feels tenderness, has occasional numbness in the left breast.  Shoulder ROM improved with therapy.   She is scheduled for MG/US/MRI and follow up with Loretta Molina NP on 11/7/24.  PCP check up 6/2024, labs done, WNL, except Cholesterol 217  GYN check up 2023, next check up in 2026 Eye exam 2024 UTD, no cataracts or glaucoma Dental exam every 6 months Yearly stool tests for colon cancer screening, due this year. Yes